# Patient Record
Sex: FEMALE | Race: BLACK OR AFRICAN AMERICAN | URBAN - METROPOLITAN AREA
[De-identification: names, ages, dates, MRNs, and addresses within clinical notes are randomized per-mention and may not be internally consistent; named-entity substitution may affect disease eponyms.]

---

## 2017-07-17 ENCOUNTER — IMPORTED ENCOUNTER (OUTPATIENT)
Dept: URBAN - METROPOLITAN AREA CLINIC 1 | Facility: CLINIC | Age: 42
End: 2017-07-17

## 2017-07-17 PROBLEM — H52.13: Noted: 2017-07-17

## 2017-07-17 PROCEDURE — 92014 COMPRE OPH EXAM EST PT 1/>: CPT

## 2017-07-17 PROCEDURE — 92015 DETERMINE REFRACTIVE STATE: CPT

## 2017-07-17 NOTE — PATIENT DISCUSSION
1. Myopia: Rx was given for correction if indicated and requested. Return for an appointment in 1 year 40/cc with Dr. Clarice Pendleton.

## 2022-04-08 ASSESSMENT — VISUAL ACUITY
OS_SC: 20/20
OD_SC: 20/20

## 2022-04-08 ASSESSMENT — TONOMETRY
OS_IOP_MMHG: 18
OD_IOP_MMHG: 18

## 2022-05-20 NOTE — PROCEDURE NOTE: CLINICAL
PROCEDURE NOTE: Punctal Plugs, Silicone #2 Bilateral Lower Lids. Diagnosis: Dry Eye Syndrome. Anesthesia: Topical. Prep: Antibiotic Drops q 5min x 3. Prior to treatment, the risks/benefits/alternatives were discussed. The patient wished to proceed with procedure. One drop of proparacaine was placed and a drop of lidocaine gel was placed over the puncta. 0.* mm permanent silicone plugs were inserted in * eyelids. Patient tolerated procedure well. There were no complications. Post procedure instructions given. Gunnar Montes

## 2022-10-11 NOTE — PATIENT DISCUSSION
Insertion and removal of CL’s taught. Referring provider: Dr. Chandler Clemens     Cardiologist:  None    Reason for consult:  PVCs    Impression:   · PVC's, NSVT  ? S/p PVC ablation 5/2/12, [facility], Dr. 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 Successful ablation of the superior portion of the right ventricular outflow tract   ? Also on verapamil. Previously on toprol, transitioned to verapamil to allow testing for stinging insects and since anaphylaxis would be easier to treat if needed   ? Last seen by EP, Dr. Locke 12/11/12- occasional skipped beats daily. 24h Holter 12/2012 showed 0.7% PVC burden. Normal echo and stress test. Advised PRN follow up.   ? Has since been following with PCP. Subsequent Holters showed low PVC burden  ? ED visit on 8/5/22 for increased palpitations (skipped beats) and associated ache in upper left chest radiating to back. ECG showed SB. Outpatient Holter.  ? 48h Holter 8/8/22: sinus, rates  bpm, average 64 bpm. PAC burden 0.05%, 1 run of SVT 15 beats in duration, rate 106 bpm. 0% PVC burden. Symptoms correlated with artifact/sinus   ? Did not have typical symptoms while wearing monitor. Question longer monitoring. Referred to EP   · Established with EP 10/11/22:  she presented originally with prominent palpitations and the documented symptomatic VPCs at times resulting near-syncope, for which she underwent successful ablation in 2012, follow-up Holter showing dramatic decrease, and she remained asymptomatic.  However, over the past year or so she has re-presented with episodic palpitations which she describes as different from the previous ones.  Remote echocardiogram short normal LVEF of 60%, CT calcium score of 0, with exercise stress test unremarkable as well.  Recent Holter showed is a 0 VPCs, rare isolated APCs without symptoms, and the patient offered complaints with normal sinus rhythm.  Her present palpitations will occur once a  month, lasting a couple of days, described as mild and well tolerated, however recently prompting an ED evaluation, which was unremarkable.  Today, she feels with the, denying complaints, above quite active without limitations, EKG showed mild sinus bradycardia at 58 ppm without ectopy.    · Cardiac Imaging    ? CT cardiac calcium score 11/29/16: 0  ? Echo 5/3/12: EF 60%  ? Exercise stress test with imaging 4/12/12: normal variant study, most consistent with normal perfusion of the left ventricle and normal function of both ventricles. Reached 95% MPHR     Recommendations:   · Unclear cause of recurrent palpitations, however recent Holter unremarkable, with no VPCs, rare asymptomatic APCs, with symptoms occurring without arrhythmia.  The episodes rarely occur, randomly.  · Continue verapamil  mg daily.  · The rest of her medical regimen as before.  · I have essentially reassured the patient, and advised her an exercise program, as well as weight loss, which may improve her general feeling of well-being.  · Electrophysiology clinic follow-up in 1 year, earlier as needed    HPI:  Salma Ornelas is a 59-year-old female seen in clinic today at the request of Dr. Chandler Clemens for further evaluation and management of PVC's. Per review of chart, she has a history of PVC's and NSVT and underwent a PVC ablation in 05/2012 at [facility] with Dr. Locke. Has been managed on verapamil as well. Note, previously on toprol, but transitioned to verapamil to allow testing for stinging insects and since anaphylaxis would be easier to treat if needed. She was last seen by EP in 12/2012 and advised PRN follow up as s/p PVC ablation, low PVC burden on monitor with normal echo and stress test. She has since been following with PCP and subsequent Holter monitors showed low/no PVC burden. Most recently, she presented to ED on 8/5/22 for increased palpitations, described as skipped beats associated chest ache radiating to back. ECG  showed SB, labs unremarkable. She was discharged home with 48 Holter monitor which showed sinus rhythm, 0% PVC burden, PAC burden 0.05% with 1 run of SVT 15 beats, rate 106 bpm. It was noted she did not have typical symptoms during monitoring period. PCP referred to EP, questioning need for longer monitor.     PAST MEDICAL HX:    Anxiety                                                       Panic attacks                                                 Hypertension                                                  Hypothyroidism                                                Migraine headaches                                              Comment: with associated transient visual loss in  the                left eye    Palpitations                                                  Near syncope                                                  Chronic rhinitis                                              Recurrent sinusitis                                           Bronchitis                                                    Fracture                                                        Comment: finger, three bones ankle, toe, elbow    Impaired fasting glucose                                      Nonsustained ventricular tachycardia                            Comment: s/p ablation 5/2/12    Premature ventricular contractions                            Fatty infiltration of liver                     02/13/2015    Left ovarian cyst                               02/13/2015      Comment: 4.6 cm simple appearing left ovarian cyst noted               on CT 2/13/2015.    Cholelithiasis                                  02/13/2015    Umbilical hernia                                02/13/2015      Comment: Small fat-containing umbilical hernia noted on                CT 2/13/2015.    Vitamin D deficiency                                          Thrombophlebitis of superficial veins of left * 1/18/2022     Obstructive sleep apnea                                          Comment: cpap    PAST SURGICAL HX:    WISDOM TOOTH EXTRACTION                                        SECTION, LOW TRANSVERSE                                Comment: 3 c-sections, 3 births    TUBAL LIGATION                                                ENDOMETRIAL ABLATION                            2011    CARDIAC ELECTROPHYSIOLOGY STUDY AND ABLATION    2012      Comment: Bilateral Femoral and Right IJ Venous Access as               well as Right Femoral 8F arterial access with                RVOT ablation for frequent PVCs and                non-sustained VT with marked reduction in PVC                burden. Dr. Marshall Melgar.    CHALAZION EXCISION                              2014      Comment: Right upper lid. Dr. Mikey Hadley.    FULL THICK GRFT NOS,EAR,LID <20SQCM                           CT CARDIAC CALCIUM SCORING                      2016      Comment: Zero.    COLONOSCOPY                                     2015      Comment: normal. repeat in 10 years ()    VARICOSE VEIN SURGERY                           2022      Comment: left AAV-RTA and left leg stabs    BIOPSY OF BREAST, NEEDLE CORE                   2014      Comment: Benign    Allergies and Medications were reviewed.    Fam Hx: No h/o sudden cardiac death or premature CAD     Social: No TOB, ETOH or Drug abuse     ROS: Above review of system completed by nursing staff and reviewed by provider. Pertinent items are noted in the history of present illness (HPI).    PHYSICAL EXAM:  Visit Vitals  /82 (BP Location: LUE - Left upper extremity, Patient Position: Sitting, Cuff Size: Large Adult)   Pulse (!) 58   Ht 5' 4\" (1.626 m)   Wt 103.4 kg (228 lb)   LMP 2017   SpO2 99%   BMI 39.14 kg/m²     GENERAL:  Cooperative, sitting comfortably, in no acute distress.  HEAD: Normocephalic, Non-traumatic  NECK: No masses  CARDIOVASCULAR:   Regular rate and  rhythm.  RESPIRATORY:  No respiratory distress, audible wheeze, tripoding.  EXTREMITIES: No clubbing or cyanosis. No pitting edema.  NEURO:  Alert and oriented to person, place, and time. No obvious motor deficits  PSYCHIATRIC:  Mood and affect normal.   INTEGUMENTARY:  Warm, no rashes or nodules.    I have personally reviewed and interpreted ECG below.  I have reviewed and summarized old records as per the impression section.    ECG:  10/11/2022:  Sinus bradycardia at 58 ppm, with non-specific ST-T flattening in the anteroseptal leads.   milliseconds    RHYTHM STRIP:  None    Device:  None    Labs:   Lab Results   Component Value Date    WBC 6.3 09/26/2022    WBC 5.7 10/17/2019    HGB 12.8 09/26/2022    HGB 14.2 10/17/2019    HCT 39.6 09/26/2022    HCT 42.5 10/17/2019     09/26/2022     10/17/2019     05/02/2012    INR 1.1 01/12/2022    POTASSIUM 4.2 09/26/2022    POTASSIUM 3.9 10/17/2019    BUN 16 09/26/2022    BUN 18 10/17/2019    CREATININE 0.77 09/26/2022    CREATININE 0.86 10/17/2019    TSH 1.070 09/26/2022    TSH 2.160 08/05/2022    TSH 1.990 10/17/2019     On 10/11/22, I, Jose Carolina MD, scribed the services personally performed